# Patient Record
Sex: FEMALE | URBAN - METROPOLITAN AREA
[De-identification: names, ages, dates, MRNs, and addresses within clinical notes are randomized per-mention and may not be internally consistent; named-entity substitution may affect disease eponyms.]

---

## 2021-10-05 ENCOUNTER — IMPORTED ENCOUNTER (OUTPATIENT)
Dept: URBAN - METROPOLITAN AREA CLINIC 31 | Facility: CLINIC | Age: 36
End: 2021-10-05

## 2021-10-05 PROCEDURE — 92015 DETERMINE REFRACTIVE STATE: CPT

## 2021-10-05 PROCEDURE — 92004 COMPRE OPH EXAM NEW PT 1/>: CPT

## 2021-10-05 NOTE — PATIENT DISCUSSION
Hyperopia OU. Final glasses rx given can try OTC 2.25 if desires. Return for an appointment in 1-2 years with Dr[address] Donnell Jaquez.

## 2022-04-01 ASSESSMENT — VISUAL ACUITY
OD_CC: 20/20-1
OS_CC: 20/15-1
OU_SC: J1

## 2022-04-01 ASSESSMENT — TONOMETRY
OS_IOP_MMHG: 12
OD_IOP_MMHG: 10